# Patient Record
Sex: FEMALE | NOT HISPANIC OR LATINO | Employment: UNEMPLOYED | URBAN - METROPOLITAN AREA
[De-identification: names, ages, dates, MRNs, and addresses within clinical notes are randomized per-mention and may not be internally consistent; named-entity substitution may affect disease eponyms.]

---

## 2023-03-28 ENCOUNTER — PATIENT OUTREACH (OUTPATIENT)
Dept: CARE COORDINATION | Facility: CLINIC | Age: 48
End: 2023-03-28
Payer: COMMERCIAL

## 2023-03-28 NOTE — PROGRESS NOTES
Discharge Facility: [facility]  Discharge Diagnosis: seizures  Admission Date: 3/19/23  Discharge Date: 3/24/23    PCP appt: TCM outreach unsuccessful x2, no contact. Per discharge summary, patient had appt schedule with [facility] family medicine Dr. Katheryn Beckham yesterday 3/27. Patient disenrolled.

## 2023-05-25 ENCOUNTER — OFFICE VISIT (OUTPATIENT)
Dept: PRIMARY CARE | Facility: CLINIC | Age: 48
End: 2023-05-25
Payer: COMMERCIAL

## 2023-05-25 ENCOUNTER — LAB (OUTPATIENT)
Dept: LAB | Facility: LAB | Age: 48
End: 2023-05-25
Payer: COMMERCIAL

## 2023-05-25 VITALS
WEIGHT: 167 LBS | DIASTOLIC BLOOD PRESSURE: 78 MMHG | SYSTOLIC BLOOD PRESSURE: 110 MMHG | HEIGHT: 65 IN | BODY MASS INDEX: 27.82 KG/M2

## 2023-05-25 DIAGNOSIS — R53.83 FATIGUE, UNSPECIFIED TYPE: ICD-10-CM

## 2023-05-25 DIAGNOSIS — I51.81 TAKOTSUBO CARDIOMYOPATHY: ICD-10-CM

## 2023-05-25 DIAGNOSIS — Z09 HOSPITAL DISCHARGE FOLLOW-UP: Primary | ICD-10-CM

## 2023-05-25 DIAGNOSIS — F41.9 ANXIETY: ICD-10-CM

## 2023-05-25 DIAGNOSIS — F11.10 OPIATE ABUSE, CONTINUOUS (MULTI): ICD-10-CM

## 2023-05-25 DIAGNOSIS — E55.9 VITAMIN D DEFICIENCY: ICD-10-CM

## 2023-05-25 DIAGNOSIS — R56.9 SEIZURE (MULTI): ICD-10-CM

## 2023-05-25 DIAGNOSIS — J69.0 ASPIRATION PNEUMONIA, UNSPECIFIED ASPIRATION PNEUMONIA TYPE, UNSPECIFIED LATERALITY, UNSPECIFIED PART OF LUNG (MULTI): ICD-10-CM

## 2023-05-25 PROBLEM — F17.200 NICOTINE DEPENDENCE: Status: ACTIVE | Noted: 2023-05-25

## 2023-05-25 PROBLEM — R07.81 RIB PAIN: Status: ACTIVE | Noted: 2023-05-25

## 2023-05-25 PROBLEM — F32.A DEPRESSION: Status: ACTIVE | Noted: 2023-05-25

## 2023-05-25 PROBLEM — E78.5 DYSLIPIDEMIA: Status: ACTIVE | Noted: 2023-04-19

## 2023-05-25 PROBLEM — R92.8 ABNORMAL MAMMOGRAM: Status: ACTIVE | Noted: 2023-05-25

## 2023-05-25 LAB
ALANINE AMINOTRANSFERASE (SGPT) (U/L) IN SER/PLAS: 10 U/L (ref 7–45)
ALBUMIN (G/DL) IN SER/PLAS: 5 G/DL (ref 3.4–5)
ALKALINE PHOSPHATASE (U/L) IN SER/PLAS: 73 U/L (ref 33–110)
ANION GAP IN SER/PLAS: 13 MMOL/L (ref 10–20)
ASPARTATE AMINOTRANSFERASE (SGOT) (U/L) IN SER/PLAS: 12 U/L (ref 9–39)
BILIRUBIN TOTAL (MG/DL) IN SER/PLAS: 0.4 MG/DL (ref 0–1.2)
CALCIDIOL (25 OH VITAMIN D3) (NG/ML) IN SER/PLAS: 38 NG/ML
CALCIUM (MG/DL) IN SER/PLAS: 10 MG/DL (ref 8.6–10.6)
CARBON DIOXIDE, TOTAL (MMOL/L) IN SER/PLAS: 28 MMOL/L (ref 21–32)
CHLORIDE (MMOL/L) IN SER/PLAS: 108 MMOL/L (ref 98–107)
COBALAMIN (VITAMIN B12) (PG/ML) IN SER/PLAS: 232 PG/ML (ref 211–911)
CREATININE (MG/DL) IN SER/PLAS: 0.82 MG/DL (ref 0.5–1.05)
GFR FEMALE: 88 ML/MIN/1.73M2
GLUCOSE (MG/DL) IN SER/PLAS: 88 MG/DL (ref 74–99)
POTASSIUM (MMOL/L) IN SER/PLAS: 4.6 MMOL/L (ref 3.5–5.3)
PROTEIN TOTAL: 7.3 G/DL (ref 6.4–8.2)
SODIUM (MMOL/L) IN SER/PLAS: 144 MMOL/L (ref 136–145)
UREA NITROGEN (MG/DL) IN SER/PLAS: 13 MG/DL (ref 6–23)

## 2023-05-25 PROCEDURE — 99214 OFFICE O/P EST MOD 30 MIN: CPT | Performed by: PHYSICIAN ASSISTANT

## 2023-05-25 PROCEDURE — 82607 VITAMIN B-12: CPT

## 2023-05-25 PROCEDURE — 82306 VITAMIN D 25 HYDROXY: CPT

## 2023-05-25 PROCEDURE — 80053 COMPREHEN METABOLIC PANEL: CPT

## 2023-05-25 PROCEDURE — 36415 COLL VENOUS BLD VENIPUNCTURE: CPT

## 2023-05-25 PROCEDURE — 4004F PT TOBACCO SCREEN RCVD TLK: CPT | Performed by: PHYSICIAN ASSISTANT

## 2023-05-25 RX ORDER — METOPROLOL SUCCINATE 50 MG/1
1 TABLET, EXTENDED RELEASE ORAL
COMMUNITY
Start: 2023-04-19

## 2023-05-25 RX ORDER — HYDROXYZINE HYDROCHLORIDE 25 MG/1
25 TABLET, FILM COATED ORAL EVERY 6 HOURS PRN
COMMUNITY
Start: 2023-03-24 | End: 2023-05-25 | Stop reason: SDUPTHER

## 2023-05-25 RX ORDER — NALTREXONE HYDROCHLORIDE 50 MG/1
50 TABLET, FILM COATED ORAL DAILY
COMMUNITY

## 2023-05-25 RX ORDER — SACUBITRIL AND VALSARTAN 24; 26 MG/1; MG/1
1 TABLET, FILM COATED ORAL 2 TIMES DAILY
COMMUNITY

## 2023-05-25 RX ORDER — ATORVASTATIN CALCIUM 20 MG/1
1 TABLET, FILM COATED ORAL NIGHTLY
COMMUNITY
Start: 2023-04-19

## 2023-05-25 RX ORDER — LACOSAMIDE 150 MG/1
150 TABLET ORAL 2 TIMES DAILY
Qty: 60 TABLET | Refills: 2 | COMMUNITY
Start: 2023-03-24 | End: 2023-06-22

## 2023-05-25 RX ORDER — ERGOCALCIFEROL 1.25 MG/1
1 CAPSULE ORAL
COMMUNITY
Start: 2023-04-23

## 2023-05-25 RX ORDER — SPIRONOLACTONE 25 MG/1
25 TABLET ORAL
COMMUNITY
Start: 2023-04-19

## 2023-05-25 RX ORDER — HYDROXYZINE HYDROCHLORIDE 25 MG/1
25 TABLET, FILM COATED ORAL EVERY 6 HOURS PRN
Qty: 120 TABLET | Refills: 0 | Status: SHIPPED | OUTPATIENT
Start: 2023-05-25 | End: 2023-06-24

## 2023-05-25 RX ORDER — CLONIDINE HYDROCHLORIDE 0.1 MG/1
0.1 TABLET ORAL 2 TIMES DAILY
Qty: 60 TABLET | Refills: 3 | COMMUNITY
Start: 2023-03-24 | End: 2023-06-28 | Stop reason: SDUPTHER

## 2023-05-25 RX ORDER — ESTRADIOL 1.53 MG/1
1 SPRAY TRANSDERMAL DAILY
COMMUNITY

## 2023-05-25 RX ORDER — DOXEPIN 3 MG/1
TABLET, FILM COATED ORAL
COMMUNITY
Start: 2023-02-22

## 2023-05-25 ASSESSMENT — PATIENT HEALTH QUESTIONNAIRE - PHQ9
1. LITTLE INTEREST OR PLEASURE IN DOING THINGS: NOT AT ALL
SUM OF ALL RESPONSES TO PHQ9 QUESTIONS 1 AND 2: 0
2. FEELING DOWN, DEPRESSED OR HOPELESS: NOT AT ALL

## 2023-05-25 NOTE — PROGRESS NOTES
"Subjective   Britt Guerra is a 48 y.o. female who presents for Hospital Follow-up (3/18/23 [place] for seizure).  HPI Britt Guerra is a 48 y.o. female presenting for a hospital follow up. Recall she was recently admitted to [facility] 3/18/23 - 3/25/23 for seizure, opioid withdrawal, aspiration pneumonia acute hypoxic respiratory failure, [place] cardiomyopathy. While admitted she was worked up extensively.     She was started on several new medications. She saw both cardiology ([facility]) and neurology ([facility]) outpatient after discharge. Keppra was discontinued, but she is to continue on vimpat. She is not following with psychiatry, but is on clonidine 0.1mg twice daily for anxiety. She was discharged on fluoxetine, but she discontinued it. States she is not using any illicit medications and plans to stay off of them.     Denies HA, dizziness, chest pain, palpitations, SOB/FERNANDES, LE edema. She is to have a repeat CXR and labs drawn.     12 point ROS reviewed and negative other than as stated in HPI    /78   Ht 1.651 m (5' 5\")   Wt 75.8 kg (167 lb)   BMI 27.79 kg/m²   Objective   Physical Exam  Vitals reviewed.   Constitutional:       General: She is not in acute distress.     Appearance: Normal appearance. She is not ill-appearing.   HENT:      Head: Normocephalic and atraumatic.   Eyes:      General: No scleral icterus.     Extraocular Movements: Extraocular movements intact.      Conjunctiva/sclera: Conjunctivae normal.      Pupils: Pupils are equal, round, and reactive to light.   Cardiovascular:      Rate and Rhythm: Normal rate and regular rhythm.      Heart sounds: Normal heart sounds. No murmur heard.     No friction rub. No gallop.   Pulmonary:      Effort: Pulmonary effort is normal. No respiratory distress.      Breath sounds: Normal breath sounds. No stridor. No wheezing, rhonchi or rales.   Musculoskeletal:      Cervical back: Normal range of motion.      Right " lower leg: No edema.      Left lower leg: No edema.   Skin:     General: Skin is warm and dry.   Neurological:      Mental Status: She is alert and oriented to person, place, and time. Mental status is at baseline.      Cranial Nerves: No cranial nerve deficit.      Gait: Gait normal.   Psychiatric:         Mood and Affect: Mood normal.         Behavior: Behavior normal.         Assessment/Plan   Problem List Items Addressed This Visit       Anxiety     Continue clonidine 0.1mg and hydroxyzine 25mg. Recommended following with psychiatry. If any thoughts of self/other harm occur, go to the ED immediately.          Relevant Medications    hydrOXYzine HCL (Atarax) 25 mg tablet    Aspiration pneumonia (CMS/HCC)     S/p ATBs. Stable currently. Chest xray ordered.         Relevant Orders    XR chest 2 views (Completed)    Opiate abuse, continuous (CMS/HCC)     Patient endorses no use currently. States she has no plans on using again. She is not following with addiction medicine at this time.          Seizure (CMS/HCC)     Stable. Continue vimpat as prescribed. Follow with neurology         [place] cardiomyopathy     Continue all medications unchanged. Follow with Cardiology. Follow a low sodium diet.          Relevant Medications    Entresto 24-26 mg tablet    metoprolol succinate XL (Toprol-XL) 50 mg 24 hr tablet    Hospital discharge follow-up - Primary    Relevant Orders    XR chest 2 views (Completed)     Other Visit Diagnoses       Vitamin D deficiency        Relevant Orders    Vitamin D 25-Hydroxy,Total (Completed)    Fatigue, unspecified type        Relevant Orders    Comprehensive metabolic panel (Completed)    Vitamin B12 (Completed)             Follow up in 6 months or sooner as needed

## 2023-05-26 ENCOUNTER — TELEPHONE (OUTPATIENT)
Dept: PRIMARY CARE | Facility: CLINIC | Age: 48
End: 2023-05-26
Payer: COMMERCIAL

## 2023-05-26 NOTE — RESULT ENCOUNTER NOTE
Lab results are back.     Kidney and liver function, electrolytes, and vitamin B12 are all stable. Vitamin D was on the lower end of normal, take an OTC vitamin D3 1000U supplement daily with food.

## 2023-05-26 NOTE — TELEPHONE ENCOUNTER
Pt informed of results via [employer].    ----- Message from Reena Blunt PA-C sent at 5/26/2023 11:12 AM EDT -----  Lab results are back.     Kidney and liver function, electrolytes, and vitamin B12 are all stable. Vitamin D was on the lower end of normal, take an OTC vitamin D3 1000U supplement daily with food.

## 2023-05-29 PROBLEM — Z09 HOSPITAL DISCHARGE FOLLOW-UP: Status: ACTIVE | Noted: 2023-05-29

## 2023-05-30 NOTE — ASSESSMENT & PLAN NOTE
Continue clonidine 0.1mg and hydroxyzine 25mg. Recommended following with psychiatry. If any thoughts of self/other harm occur, go to the ED immediately.

## 2023-05-30 NOTE — ASSESSMENT & PLAN NOTE
Patient endorses no use currently. States she has no plans on using again. She is not following with addiction medicine at this time.

## 2023-06-01 ENCOUNTER — TELEPHONE (OUTPATIENT)
Dept: PRIMARY CARE | Facility: CLINIC | Age: 48
End: 2023-06-01
Payer: COMMERCIAL

## 2023-06-01 NOTE — TELEPHONE ENCOUNTER
Pt informed of results via [employer].    ----- Message from Reena Blunt PA-C sent at 6/1/2023 10:54 AM EDT -----  Chest xray negative for pneumonia or other cardiopulmonary disorders

## 2023-06-28 DIAGNOSIS — F41.9 ANXIETY: Primary | ICD-10-CM

## 2023-06-28 RX ORDER — CLONIDINE HYDROCHLORIDE 0.1 MG/1
0.1 TABLET ORAL 2 TIMES DAILY
Qty: 60 TABLET | Refills: 4 | Status: SHIPPED | OUTPATIENT
Start: 2023-06-28 | End: 2023-11-25

## 2025-01-09 ENCOUNTER — OFFICE VISIT (OUTPATIENT)
Dept: URGENT CARE | Age: 50
End: 2025-01-09
Payer: COMMERCIAL

## 2025-01-09 VITALS
SYSTOLIC BLOOD PRESSURE: 93 MMHG | RESPIRATION RATE: 20 BRPM | HEART RATE: 90 BPM | TEMPERATURE: 97.8 F | DIASTOLIC BLOOD PRESSURE: 79 MMHG

## 2025-01-09 DIAGNOSIS — R68.89 FLU-LIKE SYMPTOMS: Primary | ICD-10-CM

## 2025-01-09 DIAGNOSIS — H66.93 BILATERAL OTITIS MEDIA, UNSPECIFIED OTITIS MEDIA TYPE: ICD-10-CM

## 2025-01-09 PROCEDURE — 99203 OFFICE O/P NEW LOW 30 MIN: CPT | Performed by: REGISTERED NURSE

## 2025-01-09 RX ORDER — AMOXICILLIN AND CLAVULANATE POTASSIUM 875; 125 MG/1; MG/1
1 TABLET, FILM COATED ORAL EVERY 12 HOURS
Qty: 14 TABLET | Refills: 0 | Status: SHIPPED | OUTPATIENT
Start: 2025-01-09 | End: 2025-01-16

## 2025-01-09 ASSESSMENT — ENCOUNTER SYMPTOMS
COUGH: 1
FEVER: 0
HEADACHES: 0
SORE THROAT: 0
FATIGUE: 0
VOMITING: 0
RHINORRHEA: 1
DIARRHEA: 0
MYALGIAS: 1
SINUS PAIN: 0
ABDOMINAL PAIN: 0
NAUSEA: 0
SINUS PRESSURE: 0
SHORTNESS OF BREATH: 0
CHILLS: 0

## 2025-01-10 NOTE — PROGRESS NOTES
Subjective   Patient ID: Britt Guerra is a 49 y.o. female. They present today with a chief complaint of Illness (Congestion, body aches, x 2 day. ).    History of Present Illness  49-year-old female presents with flulike symptoms including sore throat, sinus congestion, runny nose, and bodyaches x 2 days.  Patient denies any fevers or chills, ear pain, shortness of breath or chest pain.      History provided by:  Patient      Past Medical History  Allergies as of 2025    (No Known Allergies)       (Not in a hospital admission)       No past medical history on file.    Past Surgical History:   Procedure Laterality Date     SECTION, CLASSIC  2021     Section    OTHER SURGICAL HISTORY  2021    Hysterectomy total        reports that she has been smoking cigarettes. She has never used smokeless tobacco. She reports that she does not currently use alcohol. She reports that she does not currently use drugs.    Review of Systems  Review of Systems   Constitutional:  Negative for chills, fatigue and fever.   HENT:  Positive for congestion, postnasal drip and rhinorrhea. Negative for ear pain, sinus pressure, sinus pain and sore throat.    Respiratory:  Positive for cough. Negative for shortness of breath.    Cardiovascular:  Negative for chest pain.   Gastrointestinal:  Negative for abdominal pain, diarrhea, nausea and vomiting.   Musculoskeletal:  Positive for myalgias.        Generalized bodyaches   Neurological:  Negative for headaches.   All other systems reviewed and are negative.                                 Objective    Vitals:    25 1901   BP: 93/79   BP Location: Left arm   Patient Position: Sitting   BP Cuff Size: Adult   Pulse: 90   Resp: 20   Temp: 36.6 °C (97.8 °F)   TempSrc: Oral     No LMP recorded. Patient has had a hysterectomy.    Physical Exam  Vitals and nursing note reviewed.   Constitutional:       Appearance: Normal appearance.   HENT:      Head:  Normocephalic.      Right Ear: Tympanic membrane is erythematous and retracted.      Left Ear: Tympanic membrane is erythematous and retracted.      Nose: Congestion and rhinorrhea present. Rhinorrhea is clear.      Right Sinus: No maxillary sinus tenderness or frontal sinus tenderness.      Left Sinus: No maxillary sinus tenderness or frontal sinus tenderness.      Mouth/Throat:      Mouth: Mucous membranes are moist.      Pharynx: Posterior oropharyngeal erythema and postnasal drip present.   Eyes:      Pupils: Pupils are equal, round, and reactive to light.   Cardiovascular:      Rate and Rhythm: Normal rate and regular rhythm.   Pulmonary:      Effort: Pulmonary effort is normal.      Breath sounds: Normal breath sounds.   Abdominal:      General: Bowel sounds are normal.      Palpations: Abdomen is soft.   Skin:     General: Skin is warm and dry.      Capillary Refill: Capillary refill takes less than 2 seconds.   Neurological:      General: No focal deficit present.      Mental Status: She is alert.   Psychiatric:         Mood and Affect: Mood normal.         Procedures    Point of Care Test & Imaging Results from this visit  No results found for this visit on 01/09/25.   No results found.    Diagnostic study results (if any) were reviewed by Crystal L Severino, APRN-CNP.    Assessment/Plan   Allergies, medications, history, and pertinent labs/EKGs/Imaging reviewed by Crystal L Severino, APRN-CNP.     Medical Decision Making  Vital signs are stable, afebrile.  Chest clear, heart is regular, belly soft and nontender.  ENT exam as above consistent with bilateral OM and sore throat r/t PND.  At time of discharge patient was clinically well-appearing and HDS for outpatient management. The patient and/or family was educated regarding diagnosis, supportive care, OTC and Rx medications. The patient and/or family was given the opportunity to ask questions prior to discharge.  They verbalized understanding of my  discussion of the plans for treatment, expected course, indications to return to  or seek further evaluation in ED, and the need for timely follow up as directed.   They were provided with a work/school excuse if requested.      Orders and Diagnoses  Diagnoses and all orders for this visit:  Flu-like symptoms  Bilateral otitis media, unspecified otitis media type  -     amoxicillin-pot clavulanate (Augmentin) 875-125 mg tablet; Take 1 tablet by mouth every 12 hours for 7 days.  Tylenol/Ibuprofen for pain/discomfort  Warm compress/heating pad to affected ear  If symptoms persist/worsen, follow-up with your PCP or ENT for further evaluation      Medical Admin Record      Patient disposition: Home    Electronically signed by Crystal L Severino, APRN-CNP  7:31 PM

## 2025-01-24 ENCOUNTER — OFFICE VISIT (OUTPATIENT)
Dept: URGENT CARE | Age: 50
End: 2025-01-24
Payer: COMMERCIAL

## 2025-01-24 VITALS
DIASTOLIC BLOOD PRESSURE: 94 MMHG | SYSTOLIC BLOOD PRESSURE: 151 MMHG | TEMPERATURE: 97.8 F | HEART RATE: 93 BPM | RESPIRATION RATE: 20 BRPM

## 2025-01-24 DIAGNOSIS — J01.90 ACUTE BACTERIAL SINUSITIS: Primary | ICD-10-CM

## 2025-01-24 DIAGNOSIS — H66.001 NON-RECURRENT ACUTE SUPPURATIVE OTITIS MEDIA OF RIGHT EAR WITHOUT SPONTANEOUS RUPTURE OF TYMPANIC MEMBRANE: ICD-10-CM

## 2025-01-24 DIAGNOSIS — R05.9 COUGH IN ADULT: ICD-10-CM

## 2025-01-24 DIAGNOSIS — R06.2 WHEEZING: ICD-10-CM

## 2025-01-24 DIAGNOSIS — B96.89 ACUTE BACTERIAL SINUSITIS: Primary | ICD-10-CM

## 2025-01-24 RX ORDER — DOXYCYCLINE 100 MG/1
100 CAPSULE ORAL 2 TIMES DAILY
Qty: 20 CAPSULE | Refills: 0 | Status: SHIPPED | OUTPATIENT
Start: 2025-01-24 | End: 2025-02-03

## 2025-01-24 RX ORDER — METHYLPREDNISOLONE 4 MG/1
TABLET ORAL
Qty: 21 TABLET | Refills: 0 | Status: SHIPPED | OUTPATIENT
Start: 2025-01-24 | End: 2025-01-30

## 2025-01-24 NOTE — PROGRESS NOTES
Chief Complaint   Patient presents with    Earache     Both ears, for weeks.    Sinus Problem     Sinus pressure, headaches.     Reports symptoms going on  for over to weeks, tried OTC without relief. Symptoms of nasal discharge getting worse, cough, body aches persist.    REVIEW OF SYSTEMS:    CONSTITUTIONAL:  No fever. YES chills. No dizziness. No weakness.  EYES:  No pain, erythema, or discharge. No blurring of vision.  ENT:  YES sore throat, YES URI symptoms. No epistaxis. No tinnitus.  CARDIOVASCULAR:  No chest pain. No palpitations. No lower extremity edema.  RESPIRATORY:  No shortness of breath, YES cough, pain with respiration, pleuritic chest pain. No hemoptysis. No dyspnea. No paroxysmal nocturnal dyspnea.  GASTROINTESTINAL:  Normal appetite. No nausea, vomiting, diarrhea. No pain. No bloating. No melena.  GENITOURINARY:  No frequency, urgency, nocturia. No hematuria or dysuria.  INTEGUMENTARY:  No swelling. No bruising. No contusions. No abrasions. No lymphangitis.  NEUROLOGIC:  No headache. No neck pain. No numbness or tingling of the extremities. No weakness.  ALLERGIES:  No asthma. No urticaria.      Physical Exam  Vitals reviewed.   General: no distress  Cardiovascular:     Heart sounds: Normal heart sounds, S1 normal and S2 normal. No murmur heard.     No friction rub.   Pulmonary:      Effort: Pulmonary effort is normal.      Breath sounds: Lungs with rhonchi to auscultation bilaterally   ENT:  right TM erythematous, left TM and canals unremarkable, moderate sinus congestion present. Pharynx and tonsils are not hyperemic, and without exudate.   Abdominal:      Palpations: There is no hepatomegaly, splenomegaly or mass. Abdomen is soft, non-tender, and non-distended. No suprapubic tenderness. No CVA tenderness.    Skin:     Comments: no rash                   POC Labs:     No visits with results within 2 Day(s) from this visit.   Latest known visit with results is:   Lab on 05/25/2023   Component  Date Value Ref Range Status    Glucose 05/25/2023 88  74 - 99 mg/dL Final    Sodium 05/25/2023 144  136 - 145 mmol/L Final    Potassium 05/25/2023 4.6  3.5 - 5.3 mmol/L Final    Chloride 05/25/2023 108 (H)  98 - 107 mmol/L Final    Bicarbonate 05/25/2023 28  21 - 32 mmol/L Final    Anion Gap 05/25/2023 13  10 - 20 mmol/L Final    Urea Nitrogen 05/25/2023 13  6 - 23 mg/dL Final    Creatinine 05/25/2023 0.82  0.50 - 1.05 mg/dL Final    GFR Female 05/25/2023 88  >90 mL/min/1.73m2 Final     CALCULATIONS OF ESTIMATED GFR ARE PERFORMED   USING THE 2021 CKD-EPI STUDY REFIT EQUATION   WITHOUT THE RACE VARIABLE FOR THE IDMS-TRACEABLE   CREATININE METHODS.    https://jasn.asnjournals.org/content/early/2021/09/22/ASN.5658152806    Calcium 05/25/2023 10.0  8.6 - 10.6 mg/dL Final    Albumin 05/25/2023 5.0  3.4 - 5.0 g/dL Final    Alkaline Phosphatase 05/25/2023 73  33 - 110 U/L Final    Total Protein 05/25/2023 7.3  6.4 - 8.2 g/dL Final    AST 05/25/2023 12  9 - 39 U/L Final    Total Bilirubin 05/25/2023 0.4  0.0 - 1.2 mg/dL Final    ALT (SGPT) 05/25/2023 10  7 - 45 U/L Final     Patients treated with Sulfasalazine may generate    falsely decreased results for ALT.    Vitamin D, 25-Hydroxy 05/25/2023 38  ng/mL Final    .  DEFICIENCY:         < 20   NG/ML  INSUFFICIENCY:      20-29  NG/ML  SUFFICIENCY:         NG/ML    THIS ASSAY ACCURATELY QUANTIFIES THE SUM OF  VITAMIN D3, 25-HYDROXY AND VIT D2,25-HYDROXY.    Vitamin B-12 05/25/2023 232  211 - 911 pg/mL Final        Encounter Diagnoses   Name Primary?    Acute bacterial sinusitis Yes    Non-recurrent acute suppurative otitis media of right ear without spontaneous rupture of tympanic membrane     Wheezing     Cough in adult         Medical Decision Making & Plan:     Acute bacterial sinusitis:  Doxycycline PO  AOM right ear:  PO Abx Doxycycline  Cough/wheezing:  Medrol pack as directed  Advised to take OTC for pain, fever      01/24/25 at 4:16 PM - Sirisha Ogden,  APRN-CNP